# Patient Record
Sex: FEMALE | Race: WHITE | NOT HISPANIC OR LATINO
[De-identification: names, ages, dates, MRNs, and addresses within clinical notes are randomized per-mention and may not be internally consistent; named-entity substitution may affect disease eponyms.]

---

## 2017-03-28 ENCOUNTER — APPOINTMENT (OUTPATIENT)
Dept: SURGICAL ONCOLOGY | Facility: CLINIC | Age: 72
End: 2017-03-28

## 2017-03-28 VITALS
SYSTOLIC BLOOD PRESSURE: 150 MMHG | DIASTOLIC BLOOD PRESSURE: 84 MMHG | HEIGHT: 63 IN | BODY MASS INDEX: 24.45 KG/M2 | RESPIRATION RATE: 13 BRPM | HEART RATE: 76 BPM | WEIGHT: 138 LBS

## 2017-06-15 ENCOUNTER — APPOINTMENT (OUTPATIENT)
Dept: SURGICAL ONCOLOGY | Facility: CLINIC | Age: 72
End: 2017-06-15

## 2017-06-15 VITALS
BODY MASS INDEX: 23.92 KG/M2 | HEART RATE: 78 BPM | WEIGHT: 135 LBS | RESPIRATION RATE: 14 BRPM | TEMPERATURE: 98.4 F | SYSTOLIC BLOOD PRESSURE: 151 MMHG | HEIGHT: 63 IN | DIASTOLIC BLOOD PRESSURE: 84 MMHG

## 2017-06-15 DIAGNOSIS — Z85.820 PERSONAL HISTORY OF MALIGNANT MELANOMA OF SKIN: ICD-10-CM

## 2017-07-21 ENCOUNTER — APPOINTMENT (OUTPATIENT)
Dept: PLASTIC SURGERY | Facility: CLINIC | Age: 72
End: 2017-07-21

## 2017-08-22 ENCOUNTER — OUTPATIENT (OUTPATIENT)
Dept: OUTPATIENT SERVICES | Facility: HOSPITAL | Age: 72
LOS: 1 days | End: 2017-08-22
Payer: MEDICARE

## 2017-08-22 ENCOUNTER — FORM ENCOUNTER (OUTPATIENT)
Age: 72
End: 2017-08-22

## 2017-08-22 VITALS
OXYGEN SATURATION: 98 % | SYSTOLIC BLOOD PRESSURE: 148 MMHG | TEMPERATURE: 97 F | WEIGHT: 136.03 LBS | HEIGHT: 63 IN | HEART RATE: 70 BPM | RESPIRATION RATE: 20 BRPM | DIASTOLIC BLOOD PRESSURE: 80 MMHG

## 2017-08-22 DIAGNOSIS — R51 HEADACHE: ICD-10-CM

## 2017-08-22 DIAGNOSIS — Z01.818 ENCOUNTER FOR OTHER PREPROCEDURAL EXAMINATION: ICD-10-CM

## 2017-08-22 DIAGNOSIS — C43.72 MALIGNANT MELANOMA OF LEFT LOWER LIMB, INCLUDING HIP: Chronic | ICD-10-CM

## 2017-08-22 DIAGNOSIS — R22.0 LOCALIZED SWELLING, MASS AND LUMP, HEAD: ICD-10-CM

## 2017-08-22 DIAGNOSIS — Z98.51 TUBAL LIGATION STATUS: Chronic | ICD-10-CM

## 2017-08-22 LAB
ANION GAP SERPL CALC-SCNC: 16 MMOL/L — SIGNIFICANT CHANGE UP (ref 5–17)
BUN SERPL-MCNC: 16 MG/DL — SIGNIFICANT CHANGE UP (ref 7–23)
CALCIUM SERPL-MCNC: 10.3 MG/DL — SIGNIFICANT CHANGE UP (ref 8.4–10.5)
CHLORIDE SERPL-SCNC: 100 MMOL/L — SIGNIFICANT CHANGE UP (ref 96–108)
CO2 SERPL-SCNC: 26 MMOL/L — SIGNIFICANT CHANGE UP (ref 22–31)
CREAT SERPL-MCNC: 1.14 MG/DL — SIGNIFICANT CHANGE UP (ref 0.5–1.3)
GLUCOSE SERPL-MCNC: 124 MG/DL — HIGH (ref 70–99)
HCT VFR BLD CALC: 41.7 % — SIGNIFICANT CHANGE UP (ref 34.5–45)
HGB BLD-MCNC: 13.6 G/DL — SIGNIFICANT CHANGE UP (ref 11.5–15.5)
MCHC RBC-ENTMCNC: 30.6 PG — SIGNIFICANT CHANGE UP (ref 27–34)
MCHC RBC-ENTMCNC: 32.6 GM/DL — SIGNIFICANT CHANGE UP (ref 32–36)
MCV RBC AUTO: 93.7 FL — SIGNIFICANT CHANGE UP (ref 80–100)
PLATELET # BLD AUTO: 208 K/UL — SIGNIFICANT CHANGE UP (ref 150–400)
POTASSIUM SERPL-MCNC: 3.6 MMOL/L — SIGNIFICANT CHANGE UP (ref 3.5–5.3)
POTASSIUM SERPL-SCNC: 3.6 MMOL/L — SIGNIFICANT CHANGE UP (ref 3.5–5.3)
RBC # BLD: 4.45 M/UL — SIGNIFICANT CHANGE UP (ref 3.8–5.2)
RBC # FLD: 13.2 % — SIGNIFICANT CHANGE UP (ref 10.3–14.5)
SODIUM SERPL-SCNC: 142 MMOL/L — SIGNIFICANT CHANGE UP (ref 135–145)
WBC # BLD: 5.09 K/UL — SIGNIFICANT CHANGE UP (ref 3.8–10.5)
WBC # FLD AUTO: 5.09 K/UL — SIGNIFICANT CHANGE UP (ref 3.8–10.5)

## 2017-08-22 PROCEDURE — 85027 COMPLETE CBC AUTOMATED: CPT

## 2017-08-22 PROCEDURE — 80048 BASIC METABOLIC PNL TOTAL CA: CPT

## 2017-08-22 PROCEDURE — G0463: CPT

## 2017-08-22 RX ORDER — SODIUM CHLORIDE 9 MG/ML
3 INJECTION INTRAMUSCULAR; INTRAVENOUS; SUBCUTANEOUS EVERY 8 HOURS
Qty: 0 | Refills: 0 | Status: DISCONTINUED | OUTPATIENT
Start: 2017-08-28 | End: 2017-09-12

## 2017-08-22 RX ORDER — LIDOCAINE HCL 20 MG/ML
0.2 VIAL (ML) INJECTION ONCE
Qty: 0 | Refills: 0 | Status: DISCONTINUED | OUTPATIENT
Start: 2017-08-28 | End: 2017-09-12

## 2017-08-22 RX ORDER — APREPITANT 80 MG/1
40 CAPSULE ORAL ONCE
Qty: 0 | Refills: 0 | Status: COMPLETED | OUTPATIENT
Start: 2017-08-28 | End: 2017-08-28

## 2017-08-22 RX ORDER — CEFAZOLIN SODIUM 1 G
2000 VIAL (EA) INJECTION ONCE
Qty: 0 | Refills: 0 | Status: DISCONTINUED | OUTPATIENT
Start: 2017-08-28 | End: 2017-09-12

## 2017-08-22 NOTE — H&P PST ADULT - RS GEN PE MLT RESP DETAILS PC
respirations non-labored/airway patent/breath sounds equal/good air movement/clear to auscultation bilaterally/normal

## 2017-08-22 NOTE — H&P PST ADULT - HISTORY OF PRESENT ILLNESS
72 yr old female with h/o fore head mass for 20 yrs, with recent pain & increase in size. Now coming in for Excision of forehead mass, Possible skin graft, Local flap on 8/28/17.

## 2017-08-22 NOTE — H&P PST ADULT - PMH
Anxiety    Bloating    GERD (gastroesophageal reflux disease)    Hair loss disorder    Low back pain    Melanoma in situ of lower leg, left  2015 Anxiety  on sertraline  Bloating  on triamterine  Forehead pain  mass  GERD (gastroesophageal reflux disease)  on nexium  Hair loss disorder  on avodart  Low back pain    Melanoma in situ of lower leg, left  2015

## 2017-08-22 NOTE — H&P PST ADULT - ANESTHESIA, PREVIOUS REACTION, PROFILE
Normal rate, regular rhythm, normal S1, S2 heart sounds heard. nausea/vomiting/chills- 1986, ok since then

## 2017-08-22 NOTE — H&P PST ADULT - NSANTHOSAYNRD_GEN_A_CORE
No. MARIIA screening performed.  STOP BANG Legend: 0-2 = LOW Risk; 3-4 = INTERMEDIATE Risk; 5-8 = HIGH Risk

## 2017-08-23 ENCOUNTER — OUTPATIENT (OUTPATIENT)
Dept: OUTPATIENT SERVICES | Facility: HOSPITAL | Age: 72
LOS: 1 days | End: 2017-08-23
Payer: MEDICARE

## 2017-08-23 ENCOUNTER — APPOINTMENT (OUTPATIENT)
Dept: RADIOLOGY | Facility: CLINIC | Age: 72
End: 2017-08-23
Payer: MEDICARE

## 2017-08-23 DIAGNOSIS — C43.72 MALIGNANT MELANOMA OF LEFT LOWER LIMB, INCLUDING HIP: Chronic | ICD-10-CM

## 2017-08-23 DIAGNOSIS — Z98.51 TUBAL LIGATION STATUS: Chronic | ICD-10-CM

## 2017-08-23 DIAGNOSIS — Z85.820 PERSONAL HISTORY OF MALIGNANT MELANOMA OF SKIN: ICD-10-CM

## 2017-08-23 PROCEDURE — 71020: CPT | Mod: 26

## 2017-08-23 PROCEDURE — 71046 X-RAY EXAM CHEST 2 VIEWS: CPT

## 2017-08-28 ENCOUNTER — RESULT REVIEW (OUTPATIENT)
Age: 72
End: 2017-08-28

## 2017-08-28 ENCOUNTER — OUTPATIENT (OUTPATIENT)
Dept: OUTPATIENT SERVICES | Facility: HOSPITAL | Age: 72
LOS: 1 days | Discharge: HOME | End: 2017-08-28
Payer: MEDICARE

## 2017-08-28 VITALS
HEART RATE: 82 BPM | SYSTOLIC BLOOD PRESSURE: 160 MMHG | OXYGEN SATURATION: 100 % | DIASTOLIC BLOOD PRESSURE: 80 MMHG | HEIGHT: 63 IN | WEIGHT: 136.03 LBS | TEMPERATURE: 98 F | RESPIRATION RATE: 16 BRPM

## 2017-08-28 VITALS
SYSTOLIC BLOOD PRESSURE: 140 MMHG | OXYGEN SATURATION: 100 % | HEART RATE: 87 BPM | TEMPERATURE: 98 F | RESPIRATION RATE: 16 BRPM | DIASTOLIC BLOOD PRESSURE: 66 MMHG

## 2017-08-28 DIAGNOSIS — Z98.51 TUBAL LIGATION STATUS: Chronic | ICD-10-CM

## 2017-08-28 DIAGNOSIS — R22.0 LOCALIZED SWELLING, MASS AND LUMP, HEAD: ICD-10-CM

## 2017-08-28 DIAGNOSIS — Z01.818 ENCOUNTER FOR OTHER PREPROCEDURAL EXAMINATION: ICD-10-CM

## 2017-08-28 DIAGNOSIS — C43.72 MALIGNANT MELANOMA OF LEFT LOWER LIMB, INCLUDING HIP: Chronic | ICD-10-CM

## 2017-08-28 PROCEDURE — 21026 EXCISION OF FACIAL BONE(S): CPT

## 2017-08-28 PROCEDURE — 13131 CMPLX RPR F/C/C/M/N/AX/G/H/F: CPT | Mod: 59

## 2017-08-28 PROCEDURE — 21013 EXC FACE TUM DEEP < 2 CM: CPT

## 2017-08-28 PROCEDURE — 88305 TISSUE EXAM BY PATHOLOGIST: CPT

## 2017-08-28 PROCEDURE — 88305 TISSUE EXAM BY PATHOLOGIST: CPT | Mod: 26

## 2017-08-28 RX ORDER — ONDANSETRON 8 MG/1
4 TABLET, FILM COATED ORAL ONCE
Qty: 0 | Refills: 0 | Status: DISCONTINUED | OUTPATIENT
Start: 2017-08-28 | End: 2017-09-12

## 2017-08-28 RX ORDER — OXYCODONE HYDROCHLORIDE 5 MG/1
5 TABLET ORAL ONCE
Qty: 0 | Refills: 0 | Status: DISCONTINUED | OUTPATIENT
Start: 2017-08-28 | End: 2017-08-28

## 2017-08-28 RX ORDER — SERTRALINE 25 MG/1
1 TABLET, FILM COATED ORAL
Qty: 0 | Refills: 0 | COMMUNITY

## 2017-08-28 RX ORDER — ACETAMINOPHEN 500 MG
1000 TABLET ORAL ONCE
Qty: 0 | Refills: 0 | Status: DISCONTINUED | OUTPATIENT
Start: 2017-08-28 | End: 2017-09-12

## 2017-08-28 RX ORDER — DUTASTERIDE 0.5 MG/1
1 CAPSULE, LIQUID FILLED ORAL
Qty: 0 | Refills: 0 | COMMUNITY

## 2017-08-28 RX ORDER — SODIUM CHLORIDE 9 MG/ML
1000 INJECTION, SOLUTION INTRAVENOUS
Qty: 0 | Refills: 0 | Status: DISCONTINUED | OUTPATIENT
Start: 2017-08-28 | End: 2017-09-12

## 2017-08-28 RX ORDER — PANTOPRAZOLE SODIUM 20 MG/1
1 TABLET, DELAYED RELEASE ORAL
Qty: 0 | Refills: 0 | COMMUNITY

## 2017-08-28 RX ORDER — SIMVASTATIN 20 MG/1
1 TABLET, FILM COATED ORAL
Qty: 0 | Refills: 0 | COMMUNITY

## 2017-08-28 RX ADMIN — APREPITANT 40 MILLIGRAM(S): 80 CAPSULE ORAL at 10:01

## 2017-08-28 NOTE — ASU DISCHARGE PLAN (ADULT/PEDIATRIC). - SPECIAL INSTRUCTIONS
Resume normal diet. Avoid straining, exercise, or heavy lifting.    Take medications as instructed by prescriptions.    You have a transparent/purple liquid dressing (called Dermabond) over your surgical incisions called. Do NOT remove the Dermabond. IN a few days, the Dermabond will fall off (or peel off) on its own.    Do not remove steri strips. They will fall off on their own.  After showering, please pat steri strips dry. After surgery, some blood may escape from under the tape. It is of no consequence. The paper tape may fall off after several days. If not, I will remove it in my office.     In 24 hrs, ok to shower/rinse with warm/soapy water, pat dry (NO scrubbing or rubbing).    Follow-up with primary care doctor as well.     Call 911 and return to the ED for chest pain, shortness of breath, significant increase in pain, or significant change in color of surgical sites.

## 2017-08-28 NOTE — ASU PATIENT PROFILE, ADULT - PMH
Anxiety  on sertraline  Bloating  on triamterine  Forehead pain  mass  GERD (gastroesophageal reflux disease)  on nexium  Hair loss disorder  on avodart  Low back pain    Melanoma in situ of lower leg, left  2015

## 2017-08-28 NOTE — ASU DISCHARGE PLAN (ADULT/PEDIATRIC). - NOTIFY
Pain not relieved by Medications/Persistent Nausea and Vomiting/Bleeding that does not stop/Inability to Tolerate Liquids or Foods/Increased Irritability or Sluggishness/Fever greater than 101/Swelling that continues/Numbness, color, or temperature change to extremity Pain not relieved by Medications/Numbness, color, or temperature change to extremity/Persistent Nausea and Vomiting/Inability to Tolerate Liquids or Foods/Bleeding that does not stop/Fever greater than 101/Swelling that continues/Unable to Urinate/Increased Irritability or Sluggishness

## 2017-08-28 NOTE — ASU DISCHARGE PLAN (ADULT/PEDIATRIC). - ITEMS TO FOLLOWUP WITH YOUR PHYSICIAN'S
Please follow up with Dr. Romano Friday 9/8/17. You may call [phone] to schedule an appointment.

## 2017-08-28 NOTE — ASU DISCHARGE PLAN (ADULT/PEDIATRIC). - MEDICATION SUMMARY - MEDICATIONS TO TAKE
I will START or STAY ON the medications listed below when I get home from the hospital:    vitamins multiple  --   once a day  -- Indication: For Home medication    Avodart 0.5 mg oral capsule  -- 1 cap(s) by mouth once a day  -- Indication: For Home medication    Percocet 5/325 325 mg-5 mg oral tablet  -- 1-2 tab(s) by mouth every 4-6 hours, As Needed -for severe pain MDD:8  -- Caution federal law prohibits the transfer of this drug to any person other  than the person for whom it was prescribed.  May cause drowsiness.  Alcohol may intensify this effect.  Use care when operating dangerous machinery.  This prescription cannot be refilled.  This product contains acetaminophen.  Do not use  with any other product containing acetaminophen to prevent possible liver damage.  Using more of this medication than prescribed may cause serious breathing problems.    -- Indication: For Pain    sertraline  -- 1 tab(s) by mouth once a day (at bedtime)  -- Indication: For Home medication    simvastatin 20 mg oral tablet  -- 1 tab(s) by mouth once a day (at bedtime)  -- Indication: For Home medication    triamterene-hydrochlorothiazide 37.5mg-25mg oral capsule  -- 1 cap(s) by mouth 3 times a week  -- Indication: For Home medication    pantoprazole 40 mg oral delayed release tablet  -- 1 tab(s) by mouth once a day  -- Indication: For Home medication    Prempro 0.45 mg-1.5 mg oral tablet  -- 1 tab(s) by mouth once a day  -- Indication: For Home medication

## 2017-09-04 ENCOUNTER — TRANSCRIPTION ENCOUNTER (OUTPATIENT)
Age: 72
End: 2017-09-04

## 2017-09-08 ENCOUNTER — APPOINTMENT (OUTPATIENT)
Dept: PLASTIC SURGERY | Facility: CLINIC | Age: 72
End: 2017-09-08
Payer: MEDICARE

## 2017-09-08 DIAGNOSIS — R22.0 LOCALIZED SWELLING, MASS AND LUMP, HEAD: ICD-10-CM

## 2017-09-08 PROCEDURE — 99024 POSTOP FOLLOW-UP VISIT: CPT

## 2017-10-20 ENCOUNTER — APPOINTMENT (OUTPATIENT)
Dept: PLASTIC SURGERY | Facility: CLINIC | Age: 72
End: 2017-10-20
Payer: MEDICARE

## 2017-10-20 PROCEDURE — 99024 POSTOP FOLLOW-UP VISIT: CPT

## 2017-12-01 ENCOUNTER — APPOINTMENT (OUTPATIENT)
Dept: PLASTIC SURGERY | Facility: CLINIC | Age: 72
End: 2017-12-01
Payer: MEDICARE

## 2017-12-01 PROCEDURE — 99024 POSTOP FOLLOW-UP VISIT: CPT

## 2017-12-14 ENCOUNTER — APPOINTMENT (OUTPATIENT)
Dept: SURGICAL ONCOLOGY | Facility: CLINIC | Age: 72
End: 2017-12-14
Payer: MEDICARE

## 2017-12-14 VITALS
BODY MASS INDEX: 23.48 KG/M2 | HEIGHT: 63 IN | OXYGEN SATURATION: 97 % | DIASTOLIC BLOOD PRESSURE: 82 MMHG | SYSTOLIC BLOOD PRESSURE: 146 MMHG | HEART RATE: 75 BPM | WEIGHT: 132.5 LBS | RESPIRATION RATE: 15 BRPM

## 2017-12-14 DIAGNOSIS — R22.0 LOCALIZED SWELLING, MASS AND LUMP, HEAD: ICD-10-CM

## 2017-12-14 PROCEDURE — 99214 OFFICE O/P EST MOD 30 MIN: CPT

## 2018-03-14 PROBLEM — R22.0 MASS OF FACE: Status: ACTIVE | Noted: 2017-11-21

## 2018-06-13 ENCOUNTER — APPOINTMENT (OUTPATIENT)
Dept: SURGICAL ONCOLOGY | Facility: CLINIC | Age: 73
End: 2018-06-13
Payer: MEDICARE

## 2018-06-13 VITALS
BODY MASS INDEX: 24.63 KG/M2 | SYSTOLIC BLOOD PRESSURE: 146 MMHG | HEART RATE: 77 BPM | DIASTOLIC BLOOD PRESSURE: 83 MMHG | WEIGHT: 139 LBS | RESPIRATION RATE: 15 BRPM | HEIGHT: 63 IN

## 2018-06-13 PROCEDURE — 99214 OFFICE O/P EST MOD 30 MIN: CPT

## 2018-07-16 PROBLEM — R14.0 ABDOMINAL DISTENSION (GASEOUS): Chronic | Status: ACTIVE | Noted: 2017-08-22

## 2018-07-16 PROBLEM — F41.9 ANXIETY DISORDER, UNSPECIFIED: Chronic | Status: ACTIVE | Noted: 2017-08-22

## 2018-07-16 PROBLEM — L65.9 NONSCARRING HAIR LOSS, UNSPECIFIED: Chronic | Status: ACTIVE | Noted: 2017-08-22

## 2018-07-16 PROBLEM — D03.72 MELANOMA IN SITU OF LEFT LOWER LIMB, INCLUDING HIP: Chronic | Status: ACTIVE | Noted: 2017-08-22

## 2018-07-16 PROBLEM — K21.9 GASTRO-ESOPHAGEAL REFLUX DISEASE WITHOUT ESOPHAGITIS: Chronic | Status: ACTIVE | Noted: 2017-08-22

## 2018-12-12 ENCOUNTER — APPOINTMENT (OUTPATIENT)
Dept: SURGICAL ONCOLOGY | Facility: CLINIC | Age: 73
End: 2018-12-12
Payer: MEDICARE

## 2018-12-12 VITALS
HEIGHT: 63 IN | BODY MASS INDEX: 24.45 KG/M2 | SYSTOLIC BLOOD PRESSURE: 153 MMHG | HEART RATE: 78 BPM | WEIGHT: 138 LBS | RESPIRATION RATE: 15 BRPM | DIASTOLIC BLOOD PRESSURE: 79 MMHG

## 2018-12-12 PROBLEM — M54.5 LOW BACK PAIN: Chronic | Status: ACTIVE | Noted: 2017-08-22

## 2018-12-12 PROBLEM — R51 HEADACHE: Chronic | Status: ACTIVE | Noted: 2017-08-22

## 2018-12-12 PROCEDURE — 99214 OFFICE O/P EST MOD 30 MIN: CPT

## 2018-12-17 ENCOUNTER — RESULT REVIEW (OUTPATIENT)
Age: 73
End: 2018-12-17

## 2019-06-12 ENCOUNTER — APPOINTMENT (OUTPATIENT)
Dept: SURGICAL ONCOLOGY | Facility: CLINIC | Age: 74
End: 2019-06-12
Payer: MEDICARE

## 2019-06-12 VITALS
WEIGHT: 140 LBS | SYSTOLIC BLOOD PRESSURE: 152 MMHG | HEIGHT: 63 IN | HEART RATE: 78 BPM | BODY MASS INDEX: 24.8 KG/M2 | OXYGEN SATURATION: 97 % | DIASTOLIC BLOOD PRESSURE: 78 MMHG

## 2019-06-12 PROCEDURE — 99214 OFFICE O/P EST MOD 30 MIN: CPT

## 2019-06-12 NOTE — HISTORY OF PRESENT ILLNESS
[de-identified] : 73 year old female presents for a melanoma follow up visit. \par \par She is s/p excision of left posterior calf T2 malignant melanoma & left inguinal SLNB on August 25, 2015 by Dr. Friedman\par Patient continues ongoing dermatologic surveillance with Dr. Alas and reports no recent biopsies.\par \par She is also s/p an excisional biopsy of forehead mass with benign findings by Dr. Romano in August 2017. \par \par CXR August 2017: Clear lungs, no active disease. \par LDH 1/2019: 168 (WNL) \par \par Today she is without any complaints. \par Denies any new skin lesions, changes, or pruritic moles.\par Denies constitutional symptoms. \par Denies fever or chills.

## 2019-06-12 NOTE — PHYSICAL EXAM
[Normal] : supple, no neck mass and thyroid not enlarged [Normal Neck Lymph Nodes] : normal neck lymph nodes  [Normal Supraclavicular Lymph Nodes] : normal supraclavicular lymph nodes [Normal Axillary Lymph Nodes] : normal axillary lymph nodes [Normal] : oriented to person, place and time, with appropriate affect [de-identified] : Left calf scar well healed, no evidence of recurrence. [de-identified] : 1.5-2 cm soft tissue mass left inguinal region. US shows small inguinal lymph node with normal architecture small questionable adjacent fatty tumor.

## 2019-06-12 NOTE — ASSESSMENT
[FreeTextEntry1] : Stage 1 left calf melanoma\par JUANY\par Continue dermatologic surveillance with Dr. Alas\par LDH normal January 2019\par Repeat LDH January 2020\par RTO 1 year.

## 2019-06-12 NOTE — CONSULT LETTER
[Dear  ___] : Dear  [unfilled], [Courtesy Letter:] : I had the pleasure of seeing your patient, [unfilled], in my office today. [Please see my note below.] : Please see my note below. [Sincerely,] : Sincerely, [DrKane  ___] : Dr. VALENCIA [FreeTextEntry3] : Derik Richmond MD FACS

## 2019-06-12 NOTE — ADDENDUM
[FreeTextEntry1] : I, Sheila Thrasher, acted solely as a scribe for Dr. Derik Richmond on this date 06/12/2019.

## 2019-12-15 ENCOUNTER — RESULT REVIEW (OUTPATIENT)
Age: 74
End: 2019-12-15

## 2020-06-16 PROBLEM — C43.72 MALIGNANT MELANOMA OF LEFT LOWER LEG: Status: ACTIVE | Noted: 2017-07-21

## 2020-06-16 PROBLEM — C49.9 SARCOMA: Status: ACTIVE | Noted: 2020-06-16

## 2020-06-17 ENCOUNTER — TRANSCRIPTION ENCOUNTER (OUTPATIENT)
Age: 75
End: 2020-06-17

## 2020-06-17 ENCOUNTER — APPOINTMENT (OUTPATIENT)
Dept: SURGICAL ONCOLOGY | Facility: CLINIC | Age: 75
End: 2020-06-17
Payer: MEDICARE

## 2020-06-17 DIAGNOSIS — C49.9 MALIGNANT NEOPLASM OF CONNECTIVE AND SOFT TISSUE, UNSPECIFIED: ICD-10-CM

## 2020-06-17 DIAGNOSIS — C43.72 MALIGNANT MELANOMA OF LEFT LOWER LIMB, INCLUDING HIP: ICD-10-CM

## 2020-06-17 PROCEDURE — 99214 OFFICE O/P EST MOD 30 MIN: CPT | Mod: 95

## 2020-06-17 NOTE — ASSESSMENT
[FreeTextEntry1] : Stage 1 left calf melanoma\par JUANY\par Right shoulder sarcoma - JUANY\par Patient will continue dermatologic and surgical oncologic surveillance with her new doctors in [facility] and will followup here in [place] p[place]r.n.

## 2020-06-17 NOTE — HISTORY OF PRESENT ILLNESS
[de-identified] : 75 year old female presents for an annual melanoma follow up visit. \par \par She is s/p excision of left posterior calf T2 malignant melanoma & left inguinal SLNB on August 25, 2015 by Dr. Friedman\par Pt. lives in [place], [place] now and had pleiomorphic sarcoma resected by Dr. Vigil of Surg on. 1/20. No XRT.  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 Romano in August 2017. \par \par CXR August 2017: Clear lungs, no active disease. \par LDH 6/9/20 - 136\par CBC, LFTs within normal limits\par \par Today she is without any complaints. \par Denies any new skin lesions, changes, or pruritic moles.\par Denies constitutional symptoms. \par Denies fever or chills.

## 2020-06-17 NOTE — PHYSICAL EXAM
[de-identified] : telehealth - right shoulder and left calf scars well healed, no evidence of recurrence

## 2021-12-12 ENCOUNTER — RESULT REVIEW (OUTPATIENT)
Age: 76
End: 2021-12-12

## 2024-02-27 NOTE — H&P PST ADULT - AIRWAY
Vaccine Information Sheet, \"Influenza - Inactivated\"  given to Calin Bailey, or parent/legal guardian of  Calin Bailey and verbalized understanding.    Patient responses:    Have you ever had a reaction to a flu vaccine? No  Do you have an allergy to eggs, Latex -induced anaphylaxis, neomycin or polymixin?  No  Do you have an allergy to Thimerosal, contact lens solution, or Merthiolate? No  Have you ever had Guillian [place] Syndrome?  No  Do you have any current illness?  No  Do you have a temperature above 100.4 degrees? No  Do you have an active neurological disorder? No    Immunizations Administered       Name Date Dose Route    Influenza, FLUCELVAX, (age 6 mo+), MDCK, PF, 0.5mL 2/27/2024 0.5 mL Intramuscular    Site: Deltoid- Right    Lot: [id number]    NDC: 19759-511-74          After obtaining consent, and per orders of Jackelyn Balderrama CNP, the injection above was given by Ayesha Sumner MA.    Patient tolerated well.     OK  Concentration- OK  Appetite- OK  Psychomotor Retardation- NO  Suicidal/ Homicidal Ideations- NO  Anxiety-OK    Review of Systems   Constitutional:  Negative for chills, fatigue and fever.   HENT:  Positive for postnasal drip and rhinorrhea. Negative for congestion, ear pain, facial swelling, sinus pain, sore throat and trouble swallowing.    Eyes:  Negative for pain and visual disturbance.   Respiratory:  Positive for cough. Negative for hemoptysis, shortness of breath and wheezing.    Cardiovascular:  Negative for chest pain and palpitations.   Gastrointestinal:  Negative for abdominal pain, diarrhea, nausea and vomiting.   Genitourinary:  Positive for frequency, hesitancy and urgency. Negative for difficulty urinating, dysuria, flank pain and hematuria.   Musculoskeletal:  Negative for back pain, gait problem, myalgias and neck pain.   Skin:  Negative for color change and rash.   Allergic/Immunologic: Positive for environmental allergies.   Neurological:  Negative for dizziness, seizures, weakness and headaches.   Psychiatric/Behavioral:  Negative for agitation and sleep disturbance. The patient is not nervous/anxious.        Prior to Visit Medications    Medication Sig Taking? Authorizing Provider   bisoprolol-hydroCHLOROthiazide (ZIAC) 10-6.25 MG per tablet Take 1 tablet by mouth daily Yes Jackelyn Balderrama APRN - CNP   escitalopram (LEXAPRO) 10 MG tablet Take 1 tablet by mouth daily Yes Jackelyn Balderrama APRN - CNP   omeprazole (PRILOSEC) 40 MG delayed release capsule Take 1 capsule by mouth every morning (before breakfast) Yes Jackelyn Balderrama APRN - CNP   simvastatin (ZOCOR) 20 MG tablet TAKE 1 TABLET BY MOUTH EVERY NIGHT Yes Jackelyn Balderrama APRN - CNP   azithromycin (ZITHROMAX) 250 MG tablet 500mg on day 1 followed by 250mg on days 2 - 5 Yes Jackelyn Balderrama APRN - CNP   benzonatate (TESSALON) 100 MG capsule Take 1 capsule by mouth 3 times daily as needed for Cough Yes Jackelyn Balderrama APRN - CNP   traZODone  normal

## 2024-08-30 NOTE — ASU PATIENT PROFILE, ADULT - ANESTHESIA, PREVIOUS REACTION, PROFILE
Was able to reach Ayleen and let her know that seroquel 300 mg was discontinued and she takes seroquel 400 mg 2 tabs at bedtime now. She said Dr. Medina was changing around her medications so she was confused. I also told her that Dr. Medina is no longer at this practice and our clerical team will be reaching out to her to schedule a [facility] appt. She said she will look out for the call.    nausea/vomiting/chills- 1986, ok since then